# Patient Record
Sex: MALE | Race: WHITE | NOT HISPANIC OR LATINO | URBAN - METROPOLITAN AREA
[De-identification: names, ages, dates, MRNs, and addresses within clinical notes are randomized per-mention and may not be internally consistent; named-entity substitution may affect disease eponyms.]

---

## 2019-09-09 ENCOUNTER — TRANSFERRED RECORDS (OUTPATIENT)
Dept: HEALTH INFORMATION MANAGEMENT | Facility: CLINIC | Age: 64
End: 2019-09-09

## 2019-09-18 ENCOUNTER — MEDICAL CORRESPONDENCE (OUTPATIENT)
Dept: HEALTH INFORMATION MANAGEMENT | Facility: CLINIC | Age: 64
End: 2019-09-18

## 2019-10-03 ENCOUNTER — TELEPHONE (OUTPATIENT)
Dept: OPHTHALMOLOGY | Facility: CLINIC | Age: 64
End: 2019-10-03

## 2019-10-03 DIAGNOSIS — C44.111 BASAL CELL CARCINOMA (BCC) OF SKIN OF LEFT EYELID INCLUDING CANTHUS, UNSPECIFIED EYELID: Primary | ICD-10-CM

## 2019-10-03 NOTE — TELEPHONE ENCOUNTER
Note to oculo-plastics care coordinator to assist in scheduling    Biopsy in system from 9-9-19  Skin, left medial canthus lesion, shave biopsy:       - Basal cell carcinoma, superficial type, extending to the margin    Fabian Stern RN RN 10:52 AM 10/03/19          [facility] Call Center    Phone Message    May a detailed message be left on voicemail: yes    Reason for Call: Other: Patient is being referred for basal cell carcinoma of the left eyelid. Juan is hospitlalized and they plan on discharging him on 10/4/19. Please reveiw.     Action Taken: Other: [facility]

## 2019-10-03 NOTE — TELEPHONE ENCOUNTER
"       Dr. Rodriguez/Dr. Hoang,    Please see path report below. If you think a see and do is appropriate please place orders and Lyndsay will reach out to patient and schedule Mohs & reconstruction same day (possibly 10/16).  Patient lives in [place], [place]  I tried calling patient but got VM.    Thank you,  Angella Méndez RN RN 12:14 PM 10/03/19              GROSS DESCRIPTION:        The specimen is received in formalin, labeled with the patient's name,    medical record number and \"left medial canthus\" and consists of two    reddish-tan tissues, 2 and 3 mm in dimension. Totally embedded in one    cassette. /LAR/drk        MICROSCOPIC DIAGNOSIS:        Skin, left medial canthus lesion, shave biopsy:        - Basal cell carcinoma, superficial type, extending to the margin                Final Diagnosis performed by    ELVIRA MORA M.D.    Electronically signed 9/11/2019 4:51:32PM          "

## 2019-10-04 ENCOUNTER — TELEPHONE (OUTPATIENT)
Dept: OPHTHALMOLOGY | Facility: CLINIC | Age: 64
End: 2019-10-04

## 2019-10-04 NOTE — TELEPHONE ENCOUNTER
Called patient to schedule procedure with Dr. Rainer Rodriguez, there was no answer.  Left message with my direct line [phone].

## 2019-10-08 NOTE — TELEPHONE ENCOUNTER
Called patient to schedule procedure with Dr. Rainer Rdoriguez, there was no answer.  Left message with my direct line [phone].